# Patient Record
Sex: FEMALE | Race: WHITE | HISPANIC OR LATINO | URBAN - METROPOLITAN AREA
[De-identification: names, ages, dates, MRNs, and addresses within clinical notes are randomized per-mention and may not be internally consistent; named-entity substitution may affect disease eponyms.]

---

## 2024-01-05 ENCOUNTER — APPOINTMENT (OUTPATIENT)
Dept: OBSTETRICS AND GYNECOLOGY | Facility: CLINIC | Age: 44
End: 2024-01-05
Payer: COMMERCIAL

## 2024-02-14 ENCOUNTER — OFFICE VISIT (OUTPATIENT)
Dept: OBSTETRICS AND GYNECOLOGY | Facility: CLINIC | Age: 44
End: 2024-02-14
Payer: COMMERCIAL

## 2024-02-14 VITALS
HEIGHT: 62 IN | SYSTOLIC BLOOD PRESSURE: 116 MMHG | DIASTOLIC BLOOD PRESSURE: 80 MMHG | BODY MASS INDEX: 26.35 KG/M2 | WEIGHT: 143.2 LBS

## 2024-02-14 DIAGNOSIS — Z12.31 VISIT FOR SCREENING MAMMOGRAM: ICD-10-CM

## 2024-02-14 DIAGNOSIS — Z31.69 INFERTILITY COUNSELING: Primary | ICD-10-CM

## 2024-02-14 DIAGNOSIS — Z01.419 WELL WOMAN EXAM WITH ROUTINE GYNECOLOGICAL EXAM: ICD-10-CM

## 2024-02-14 PROCEDURE — 1036F TOBACCO NON-USER: CPT | Performed by: OBSTETRICS & GYNECOLOGY

## 2024-02-14 PROCEDURE — 99396 PREV VISIT EST AGE 40-64: CPT | Performed by: OBSTETRICS & GYNECOLOGY

## 2024-02-14 PROCEDURE — 88175 CYTOPATH C/V AUTO FLUID REDO: CPT

## 2024-02-14 PROCEDURE — 87624 HPV HI-RISK TYP POOLED RSLT: CPT

## 2024-02-14 ASSESSMENT — PATIENT HEALTH QUESTIONNAIRE - PHQ9
SUM OF ALL RESPONSES TO PHQ9 QUESTIONS 1 & 2: 0
2. FEELING DOWN, DEPRESSED OR HOPELESS: NOT AT ALL
1. LITTLE INTEREST OR PLEASURE IN DOING THINGS: NOT AT ALL

## 2024-02-14 NOTE — PROGRESS NOTES
"Annual Exam    Pap: 2016 nml HPV-  Dez: Never  LMP: 2024  CC: Perimeno?    Adriane Hughes MA II     ANNUAL SUBJECTIVE    Odalis Del Rio is a 43 y.o. female who presents for annual exam today.  Her periods are regular.  She is using nothing for contraception and is happy with this.  She has no complaints today.   Did IVF in 2019 for her first child, considering again but unsure.    PMH - none    PSH - LTCSx1    OB history -   # 1 - Date: None, Sex: None, Weight: None, GA: None, Delivery: None, Apgar1: None, Apgar5: None, Living: None, Birth Comments: None    # 2 - Date: None, Sex: None, Weight: None, GA: None, Delivery: None, Apgar1: None, Apgar5: None, Living: None, Birth Comments: None      Last pap -   Normal HPV Negative     Last mammogram - never    Family history of breast or ovarian cancer - none    OBJECTIVE  /80   Ht 1.575 m (5' 2\")   Wt 65 kg (143 lb 3.2 oz)   LMP 2024   BMI 26.19 kg/m²     General Appearance   - consistent with stated age, well groomed and cooperative    Integumentary  - skin warm and dry without rash    Head and Neck  - normalocephalic and neck supple    Chest and Lung Exam  - normal breathing effort, no respiratory distress    Breast  - symmetry noted, no mass palpable, no skin change and no nipple discharge.    Abdomen  - soft, nontender and no hepatomegaly, splenomegaly, or mass    Female Genitourinary  - vulva normal without rash or lesion, normal vaginal rugae, no vaginal discharge, uterus normal size & no palpable masses, no adnexal mass, no adnexal tenderness, no cervical motion tenderness    Peripheral Vascular  - no edema present    ASSESSMENT/PLAN  43 y.o. yo  female who presents for annual exam.       Actions performed during this visit include:  - Clinical breast exam normal  - Clinical pelvic exam normal  - Pap: done today  - Mammogram due, ordered today  - Contraception declines  - Referrals [facility], prior patient of Dr" Marrufo's    Please return for your next visit in 1 year.    Sobia Chaudhry MD

## 2024-03-01 ENCOUNTER — HOSPITAL ENCOUNTER (OUTPATIENT)
Dept: RADIOLOGY | Facility: HOSPITAL | Age: 44
Discharge: HOME | End: 2024-03-01
Payer: COMMERCIAL

## 2024-03-01 DIAGNOSIS — Z12.31 VISIT FOR SCREENING MAMMOGRAM: ICD-10-CM

## 2024-03-01 LAB
CYTOLOGY CMNT CVX/VAG CYTO-IMP: NORMAL
HPV HR 12 DNA GENITAL QL NAA+PROBE: NEGATIVE
HPV HR GENOTYPES PNL CVX NAA+PROBE: NEGATIVE
HPV16 DNA SPEC QL NAA+PROBE: NEGATIVE
HPV18 DNA SPEC QL NAA+PROBE: NEGATIVE
LAB AP HPV GENOTYPE QUESTION: YES
LAB AP HPV HR: NORMAL
LABORATORY COMMENT REPORT: NORMAL
PATH REPORT.TOTAL CANCER: NORMAL

## 2024-03-01 PROCEDURE — 77063 BREAST TOMOSYNTHESIS BI: CPT | Performed by: STUDENT IN AN ORGANIZED HEALTH CARE EDUCATION/TRAINING PROGRAM

## 2024-03-01 PROCEDURE — 77067 SCR MAMMO BI INCL CAD: CPT

## 2024-03-01 PROCEDURE — 77067 SCR MAMMO BI INCL CAD: CPT | Performed by: STUDENT IN AN ORGANIZED HEALTH CARE EDUCATION/TRAINING PROGRAM

## 2025-02-26 ENCOUNTER — APPOINTMENT (OUTPATIENT)
Dept: PRIMARY CARE | Facility: CLINIC | Age: 45
End: 2025-02-26
Payer: COMMERCIAL

## 2025-02-26 VITALS
BODY MASS INDEX: 26.54 KG/M2 | HEART RATE: 70 BPM | DIASTOLIC BLOOD PRESSURE: 54 MMHG | HEIGHT: 62 IN | WEIGHT: 144.2 LBS | SYSTOLIC BLOOD PRESSURE: 102 MMHG | TEMPERATURE: 97.7 F

## 2025-02-26 DIAGNOSIS — Z13.29 SCREENING FOR THYROID DISORDER: ICD-10-CM

## 2025-02-26 DIAGNOSIS — E55.9 VITAMIN D DEFICIENCY: Primary | ICD-10-CM

## 2025-02-26 DIAGNOSIS — Z13.1 SCREENING FOR DIABETES MELLITUS: ICD-10-CM

## 2025-02-26 DIAGNOSIS — Z13.220 LIPID SCREENING: ICD-10-CM

## 2025-02-26 PROCEDURE — 99396 PREV VISIT EST AGE 40-64: CPT | Performed by: FAMILY MEDICINE

## 2025-02-26 PROCEDURE — 3008F BODY MASS INDEX DOCD: CPT | Performed by: FAMILY MEDICINE

## 2025-02-26 PROCEDURE — 1036F TOBACCO NON-USER: CPT | Performed by: FAMILY MEDICINE

## 2025-02-26 RX ORDER — CHOLECALCIFEROL (VITAMIN D3) 25 MCG
1000 TABLET ORAL DAILY
COMMUNITY

## 2025-02-26 ASSESSMENT — PATIENT HEALTH QUESTIONNAIRE - PHQ9
2. FEELING DOWN, DEPRESSED OR HOPELESS: NOT AT ALL
SUM OF ALL RESPONSES TO PHQ9 QUESTIONS 1 AND 2: 0
1. LITTLE INTEREST OR PLEASURE IN DOING THINGS: NOT AT ALL

## 2025-02-26 NOTE — PROGRESS NOTES
"    /54   Pulse 70   Temp 36.5 °C (97.7 °F)   Ht 1.575 m (5' 2\")   Wt 65.4 kg (144 lb 3.2 oz)   BMI 26.37 kg/m²     Past Medical History:   Diagnosis Date    Encounter for fertility testing 02/10/2019    Fertility testing    Encounter for immunization     Need for Tdap vaccination    Encounter for other general counseling and advice on procreation 10/01/2018    Encounter for preconception consultation    Encounter for other procreative management 2017    Encounter for artificial insemination    Encounter for pregnancy test, result positive ([facility]) 2019    Encounter for pregnancy test, result positive    Encounter for pregnancy test, result unknown 2019    Encounter for pregnancy test, result unknown    Encounter for pregnancy test, result unknown 2019    Unconfirmed pregnancy    Encounter for preprocedural laboratory examination 2018    Encounter for preprocedural laboratory examination    Encounter for routine postpartum follow-up 2019    Postpartum exam    Encounter for screening for infections with a predominantly sexual mode of transmission 2016    Screening examination for STD (sexually transmitted disease)    Encounter for screening for other disorder 10/01/2018    Screening, maternal , for chromosome anomaly    Encounter for supervision of normal pregnancy, unspecified, unspecified trimester 10/08/2019    Prenatal care    Missed  ([facility]) 2018    Missed     Other abnormality of red blood cells 2019    Elevated hematocrit    Other acute postprocedural pain 2019    Postoperative pain    Other conditions influencing health status 2019    History of pregnancy    Other conditions influencing health status 2019    Advanced maternal age (AMA) in pregnancy    Other mental disorders complicating the puerperium ([facility]) 2019    Postpartum anxiety    Personal history of other complications of pregnancy, " childbirth and the puerperium 10/01/2018    History of miscarriage, not currently pregnant    Personal history of other specified conditions 01/07/2014    History of urinary frequency    Personal history of urinary (tract) infections 10/13/2017    History of urinary tract infection    Pregnancy related conditions, unspecified, unspecified trimester ([facility]) 08/14/2018    Pregnancy, abnormal    Pregnancy with inconclusive fetal viability, not applicable or unspecified 08/10/2018    Encounter to determine fetal viability of pregnancy    Supervision of pregnancy resulting from assisted reproductive technology, unspecified trimester 07/17/2019    Pregnancy resulting from in vitro fertilization    Urinary tract infection, site not specified 02/20/2018    Acute urinary tract infection       There is no problem list on file for this patient.      Current Outpatient Medications   Medication Sig Dispense Refill    cholecalciferol (Vitamin D3) 25 mcg (1000 units) tablet Take 1 tablet (1,000 Units) by mouth once daily.       No current facility-administered medications for this visit.       CC/HPI/ASSESSMENT/PLAN    CC annual wellness visit    HPI patient 44-year-old here for wellness visit.  No cognitive deficits noted.  We discussed colonoscopy next year when she turns 45.  She declines immunizations.  She is up-to-date with mammography.  She sees gynecology on a regular basis.  Patient would like blood work.  She denies headache fever chest pain palpitation short of breath abdominal pain diarrhea blood in urine or stool.  Patient would like to lose a few more pounds, we discussed dietary changes to allow for weight loss, primarily decreasing starch intakes.  ROS negative except noted above.  Past medical social surgical history reviewed    Exam calm vital stable eyes no jaundice ears clear bilaterally mouth moist throat clear neck supple no LAD goiter no carotid bruit.  Lungs CTA good AE.  CV RRR no murmur.  Abdomen soft  nontender back straight no scoliosis.  Ext full ROM of all extremities no edema or cyanosis.  Skin no rash.  Neuro alert oriented no focal neurologic deficit noted.  No cognitive deficits noted.  Psych calm pleasant female no anxiety or depression    A/P 1.  Annual wellness visit.  No cognitive deficits noted.  She declines immunizations.  Colonoscopy will be needed next year.  Blood work is ordered.  Mammogram up-to-date.  Dietary changes discussed.  Follow-up 1 year or sooner as needed.    There are no diagnoses linked to this encounter.

## 2025-04-04 ENCOUNTER — APPOINTMENT (OUTPATIENT)
Dept: OBSTETRICS AND GYNECOLOGY | Facility: CLINIC | Age: 45
End: 2025-04-04
Payer: COMMERCIAL

## 2025-05-07 ENCOUNTER — APPOINTMENT (OUTPATIENT)
Dept: OBSTETRICS AND GYNECOLOGY | Facility: CLINIC | Age: 45
End: 2025-05-07
Payer: COMMERCIAL

## 2025-05-07 VITALS
DIASTOLIC BLOOD PRESSURE: 67 MMHG | SYSTOLIC BLOOD PRESSURE: 110 MMHG | HEART RATE: 66 BPM | WEIGHT: 138 LBS | BODY MASS INDEX: 25.4 KG/M2 | OXYGEN SATURATION: 98 % | HEIGHT: 62 IN

## 2025-05-07 DIAGNOSIS — Z12.31 VISIT FOR SCREENING MAMMOGRAM: ICD-10-CM

## 2025-05-07 DIAGNOSIS — Z01.419 WELL WOMAN EXAM WITH ROUTINE GYNECOLOGICAL EXAM: Primary | ICD-10-CM

## 2025-05-07 PROCEDURE — 3008F BODY MASS INDEX DOCD: CPT | Performed by: OBSTETRICS & GYNECOLOGY

## 2025-05-07 PROCEDURE — 1036F TOBACCO NON-USER: CPT | Performed by: OBSTETRICS & GYNECOLOGY

## 2025-05-07 PROCEDURE — 99396 PREV VISIT EST AGE 40-64: CPT | Performed by: OBSTETRICS & GYNECOLOGY

## 2025-05-07 NOTE — PROGRESS NOTES
"Patient presents for an annual exam  Last PAP 2024 NEG HPV-  Last Mammogram 3/01/2024 NEG    SEAN Paz    ANNUAL SUBJECTIVE    Odalis Del Rio is a 44 y.o. female who presents for annual exam today.  Her periods are regular but very heavy, looking for options to help this.     PMH - none    PSH - LTCSx1    OB history -   # 1 - Date: None, Sex: None, Weight: None, GA: None, Type: None, Apgar1: None, Apgar5: None, Living: None, Birth Comments: None    # 2 - Date: None, Sex: None, Weight: None, GA: None, Type: None, Apgar1: None, Apgar5: None, Living: None, Birth Comments: None      Last pap -   Normal HPV Negative 2024    Last mammogram - 3/2024    Family history of breast or ovarian cancer - none    OBJECTIVE  /67 (BP Location: Right arm, Patient Position: Sitting, BP Cuff Size: Adult)   Pulse 66   Ht 1.575 m (5' 2\")   Wt 62.6 kg (138 lb)   LMP 2025   SpO2 98%   BMI 25.24 kg/m²     General Appearance   - consistent with stated age, well groomed and cooperative    Integumentary  - skin warm and dry without rash    Head and Neck  - normalocephalic and neck supple    Chest and Lung Exam  - normal breathing effort, no respiratory distress    Breast  - symmetry noted, no mass palpable, no skin change and no nipple discharge.    Abdomen  - soft, nontender and no hepatomegaly, splenomegaly, or mass    Female Genitourinary  - vulva normal without rash or lesion, normal vaginal rugae, no vaginal discharge, uterus normal size & no palpable masses, no adnexal mass, no adnexal tenderness, no cervical motion tenderness    Peripheral Vascular  - no edema present    ASSESSMENT/PLAN  44 y.o. yo  female who presents for annual exam.       Actions performed during this visit include:  - Clinical breast exam normal  - Clinical pelvic exam normal  - Pap: up to date  - Mammogram due, ordered today  -  Plan mirena IUD for AUB, will return for placement     Sobia Chaudhry MD        "

## 2025-05-13 ENCOUNTER — APPOINTMENT (OUTPATIENT)
Dept: OBSTETRICS AND GYNECOLOGY | Facility: CLINIC | Age: 45
End: 2025-05-13
Payer: COMMERCIAL

## 2025-05-13 VITALS
HEART RATE: 67 BPM | SYSTOLIC BLOOD PRESSURE: 124 MMHG | HEIGHT: 62 IN | DIASTOLIC BLOOD PRESSURE: 73 MMHG | BODY MASS INDEX: 25.76 KG/M2 | OXYGEN SATURATION: 98 % | WEIGHT: 140 LBS

## 2025-05-13 DIAGNOSIS — Z30.430 ENCOUNTER FOR INSERTION OF MIRENA IUD: ICD-10-CM

## 2025-05-13 LAB — PREGNANCY TEST URINE, POC: NEGATIVE

## 2025-05-13 PROCEDURE — 58300 INSERT INTRAUTERINE DEVICE: CPT | Performed by: OBSTETRICS & GYNECOLOGY

## 2025-05-13 PROCEDURE — 81025 URINE PREGNANCY TEST: CPT | Performed by: OBSTETRICS & GYNECOLOGY

## 2025-05-13 NOTE — PROGRESS NOTES
Patient ID: Odalis Del Rio is a 44 y.o. female.    IUD Insertion    Performed by: Sobia Chaudhry MD  Authorized by: Sobia Chaudhry MD    Procedure: IUD insertion    Consent obtained by patient, parent, or legal power of  - including discussion of procedure risks and benefits, patient questions answered, and patient education provided: yes    Pregnancy risk: reasonably certain the patient is not pregnant    Immediately prior to procedure a time out was called: yes    Pelvic exam performed: yes    Speculum placed in vagina: yes    Cervix cleaned and prepped: yes    Tenaculum/Allis/Ring Forceps applied to cervix: yes    Uterus sound depth (cm):  7  Cervix manually dilated: no    IUD inserted without complications: yes    OSM: levonorgestrel 20 mcg/24hr  Strings trimmed to (cm):  3  Patient tolerated procedure well: yes      Uncomplicated mirena IUD insertion after informed consent obtained from patient after discussion of risks including bleeding, infection, uterine perforation requiring laparoscopic surgery.  RTC 4-6 wks for string check.    Sobia Chaudhry MD

## 2025-06-02 ENCOUNTER — APPOINTMENT (OUTPATIENT)
Dept: RADIOLOGY | Facility: HOSPITAL | Age: 45
End: 2025-06-02
Payer: COMMERCIAL

## 2025-06-20 ENCOUNTER — APPOINTMENT (OUTPATIENT)
Dept: OBSTETRICS AND GYNECOLOGY | Facility: CLINIC | Age: 45
End: 2025-06-20
Payer: COMMERCIAL

## 2025-06-20 VITALS
BODY MASS INDEX: 26.13 KG/M2 | SYSTOLIC BLOOD PRESSURE: 116 MMHG | HEART RATE: 68 BPM | HEIGHT: 62 IN | OXYGEN SATURATION: 98 % | DIASTOLIC BLOOD PRESSURE: 73 MMHG | WEIGHT: 142 LBS

## 2025-06-20 DIAGNOSIS — T83.32XA INTRAUTERINE CONTRACEPTIVE DEVICE THREADS LOST, INITIAL ENCOUNTER: ICD-10-CM

## 2025-06-20 PROCEDURE — 3008F BODY MASS INDEX DOCD: CPT | Performed by: OBSTETRICS & GYNECOLOGY

## 2025-06-20 PROCEDURE — 99213 OFFICE O/P EST LOW 20 MIN: CPT | Performed by: OBSTETRICS & GYNECOLOGY

## 2025-06-20 RX ORDER — LEVONORGESTREL 52 MG/1
1 INTRAUTERINE DEVICE INTRAUTERINE ONCE
COMMUNITY

## 2025-06-20 ASSESSMENT — ENCOUNTER SYMPTOMS
FEVER: 0
HEMATURIA: 0
DYSURIA: 0
HEADACHES: 0
DIARRHEA: 0
BACK PAIN: 0
VOMITING: 0
ABDOMINAL PAIN: 0
FREQUENCY: 0
FLANK PAIN: 0
SORE THROAT: 0
CHILLS: 0
NAUSEA: 0
ANOREXIA: 0
CONSTIPATION: 0

## 2025-06-20 NOTE — PROGRESS NOTES
Subjective   Patient ID: Odalis Del Rio is a 44 y.o. female who presents for Follow-up (Mirena string check).      45 y/o  presenting for mirena IUD string check. First period was heavy but this was shortly after placement, then had spotting, now just started spotting again yesterday but this is when her period would be due and is currently light.  Had some cramping which resolved.     Review of Systems   Constitutional:  Negative for chills and fever.   HENT:  Negative for sore throat.    Gastrointestinal:  Negative for abdominal pain, constipation, diarrhea, nausea and vomiting.   Genitourinary:  Positive for vaginal discharge. Negative for dysuria, flank pain, frequency, hematuria, pelvic pain and urgency.   Musculoskeletal:  Negative for back pain.   Skin:  Negative for rash.   Neurological:  Negative for headaches.     Objective   Physical Exam  Gen in NAD  Pelvic IUD strings not seen, small amount of brown discharge in vault  TVUS  IUD likely visualized (images difficult but able to see top and bottom of IUD and shadowing from device)    Assessment/Plan   45 y/o  presenting for mirena IUD string check.  IUD strings not visualized. TVUS with IUD likely visualized within the endometrial canal but images not perfect so will check formal TVUS.  As long as in place by TVUS, will plan to watch bleeding for now as feel this current period is an improvement.    MD Sobia Montejo MD 25 12:13 PM

## 2025-06-24 ENCOUNTER — HOSPITAL ENCOUNTER (OUTPATIENT)
Dept: RADIOLOGY | Facility: HOSPITAL | Age: 45
Discharge: HOME | End: 2025-06-24
Payer: COMMERCIAL

## 2025-06-24 VITALS — HEIGHT: 62 IN | WEIGHT: 142 LBS | BODY MASS INDEX: 26.13 KG/M2

## 2025-06-24 DIAGNOSIS — Z12.31 VISIT FOR SCREENING MAMMOGRAM: ICD-10-CM

## 2025-06-24 PROCEDURE — 77063 BREAST TOMOSYNTHESIS BI: CPT

## 2025-06-24 PROCEDURE — 77067 SCR MAMMO BI INCL CAD: CPT | Performed by: STUDENT IN AN ORGANIZED HEALTH CARE EDUCATION/TRAINING PROGRAM

## 2025-06-24 PROCEDURE — 77063 BREAST TOMOSYNTHESIS BI: CPT | Performed by: STUDENT IN AN ORGANIZED HEALTH CARE EDUCATION/TRAINING PROGRAM

## 2025-07-01 ENCOUNTER — HOSPITAL ENCOUNTER (OUTPATIENT)
Dept: RADIOLOGY | Facility: CLINIC | Age: 45
Discharge: HOME | End: 2025-07-01
Payer: COMMERCIAL

## 2025-07-01 DIAGNOSIS — T83.32XA INTRAUTERINE CONTRACEPTIVE DEVICE THREADS LOST, INITIAL ENCOUNTER: ICD-10-CM

## 2025-07-01 PROCEDURE — 76830 TRANSVAGINAL US NON-OB: CPT | Performed by: STUDENT IN AN ORGANIZED HEALTH CARE EDUCATION/TRAINING PROGRAM

## 2025-07-01 PROCEDURE — 76856 US EXAM PELVIC COMPLETE: CPT | Performed by: STUDENT IN AN ORGANIZED HEALTH CARE EDUCATION/TRAINING PROGRAM

## 2025-07-01 PROCEDURE — 76830 TRANSVAGINAL US NON-OB: CPT
